# Patient Record
Sex: MALE | Race: OTHER | Employment: FULL TIME | URBAN - METROPOLITAN AREA
[De-identification: names, ages, dates, MRNs, and addresses within clinical notes are randomized per-mention and may not be internally consistent; named-entity substitution may affect disease eponyms.]

---

## 2024-01-22 ENCOUNTER — HOSPITAL ENCOUNTER (EMERGENCY)
Age: 39
Discharge: HOME | End: 2024-01-22
Attending: EMERGENCY MEDICINE

## 2024-01-22 VITALS
OXYGEN SATURATION: 99 % | WEIGHT: 185 LBS | BODY MASS INDEX: 25.9 KG/M2 | HEART RATE: 108 BPM | TEMPERATURE: 98.3 F | SYSTOLIC BLOOD PRESSURE: 167 MMHG | RESPIRATION RATE: 15 BRPM | DIASTOLIC BLOOD PRESSURE: 107 MMHG | HEIGHT: 71 IN

## 2024-01-22 DIAGNOSIS — Z00.8 ENCOUNTER FOR PSYCHOLOGICAL EVALUATION: Primary | ICD-10-CM

## 2024-01-22 PROCEDURE — 99283 EMERGENCY DEPT VISIT LOW MDM: CPT

## 2024-01-22 ASSESSMENT — PAIN - FUNCTIONAL ASSESSMENT: PAIN_FUNCTIONAL_ASSESSMENT: NONE - DENIES PAIN

## 2024-01-22 ASSESSMENT — LIFESTYLE VARIABLES
HOW OFTEN DO YOU HAVE A DRINK CONTAINING ALCOHOL: NEVER
HOW MANY STANDARD DRINKS CONTAINING ALCOHOL DO YOU HAVE ON A TYPICAL DAY: PATIENT DOES NOT DRINK

## 2024-01-22 NOTE — ED TRIAGE NOTES
Mode of arrival (squad #, walk in, police, etc) : EMS        Chief complaint(s): mental health evaluation         Arrival Note (brief scenario, treatment PTA, etc).: pt brought to ed for mental health evaluation. Pt tried to turn self in for warrants and department stated no active warrants. Pt coworker reported to ems pt has been acting off due to stress. Pt denies AH, VH, SI, HI         C= \"Have you ever felt that you should Cut down on your drinking?\"  No  A= \"Have people Annoyed you by criticizing your drinking?\"  No  G= \"Have you ever felt bad or Guilty about your drinking?\"  No  E= \"Have you ever had a drink as an Eye-opener first thing in the morning to steady your nerves or to help a hangover?\"  No      Deferred []      Reason for deferring: N/A    *If yes to two or more: probable alcohol abuse.*

## 2024-01-22 NOTE — ED PROVIDER NOTES
home to [place] and follow-up with his doctor regarding his concern that he may have some sort of psychiatric illness.      CRITICAL CARE:       PROCEDURES:    Procedures      DATA FOR LAB AND RADIOLOGY TESTS ORDERED BELOW ARE REVIEWED BY THE ED CLINICIAN:    RADIOLOGY: All x-rays, CT, MRI, and formal ultrasound images (except ED bedside ultrasound) are read by the radiologist, see reports below, unless otherwise noted in MDM or here.  Reports below are reviewed by myself.  No orders to display       LABS: Lab orders shown below, the results are reviewed by myself, and all abnormals are listed below.  Labs Reviewed - No data to display    Vitals Reviewed:    Vitals:    01/22/24 1127   BP: (!) 167/107   Pulse: (!) 108   Resp: 15   Temp: 98.3 °F (36.8 °C)   TempSrc: Oral   SpO2: 99%   Weight: 83.9 kg (185 lb)   Height: 1.8 m (5' 10.87\")     MEDICATIONS GIVEN TO PATIENT THIS ENCOUNTER:  No orders of the defined types were placed in this encounter.    DISCHARGE PRESCRIPTIONS:  There are no discharge medications for this patient.    PHYSICIAN CONSULTS ORDERED THIS ENCOUNTER:  None  FINAL IMPRESSION      1. Encounter for psychological evaluation          DISPOSITION/PLAN   DISPOSITION Decision To Discharge 01/22/2024 12:01:56 PM      OUTPATIENT FOLLOW UP THE PATIENT:  [facility]  [address] [address]  [phone]  Go to   As needed, If symptoms worsen      DO Leslie Gillespie Mansour Mohamed I, DO  01/24/24 7358

## 2024-01-22 NOTE — ED NOTES
Provisional Diagnosis:        Psychosocial and Contextual Factors:   Patient presents at the ED via EMS due to \"acting strange.\"     C-SSRS Summary:  X    Patient: X  Family:    Agency: X    Substance Abuse: \"blue akshat\"     Present Suicidal Behavior:  Patient denies    Verbal:      Attempt:     Past Suicidal Behavior: Patient denies    Verbal:     Attempt:       Self-Injurious/Self-Mutilation:       Violence Current or Past        Trauma Identified: [employer]    Protective Factors:    With [facility], has support system       Risk Factors:    Not on medication       Clinical Summary:    Huber Cunningham is a 38 y.o. male who presents at the ED via EMS due to \"acting strange.\"     Patient is a  from [place] who is currently in [place] picking up a load with his partner to drive to [place].     Patient denies SI/HI/AH/VH.    Per EMS, patient had called police in the area to come arrest him due to his warrants. Police reported that patient has no warrants in [place] and had no reason to arrest him. EMS stated his  partner told them he has been acting \"strange\" for the last couple days.     Patient reports that he had called police because he thought he had warrants in [place] and stated \"I figured in would be easier to have them take me to [place].\" Patient unsure why he would have warrants or what he would have warrants for. Patient reports going through a divorce with his wife. Patient stated \"I think it is my schizophrenia acting up.\" Patient reports not being diagnosed with anything and cannot tell writer why he would be diagnosed with schizophrenia.    Patient stated he is a [employer]  who served 7 years and has been overseas more than twice. Patient reports being in combat zones and possible PTSD. [facility] but was unsure what they are doing. Patient denies any medication besides vitamins.     Patient reports being a heavy drinker when home. Patient reports before

## 2024-01-24 ASSESSMENT — ENCOUNTER SYMPTOMS
NAUSEA: 0
COUGH: 0
ABDOMINAL PAIN: 0
SHORTNESS OF BREATH: 0
VOMITING: 0